# Patient Record
Sex: FEMALE | Race: AMERICAN INDIAN OR ALASKA NATIVE
[De-identification: names, ages, dates, MRNs, and addresses within clinical notes are randomized per-mention and may not be internally consistent; named-entity substitution may affect disease eponyms.]

---

## 2019-03-19 ENCOUNTER — HOSPITAL ENCOUNTER (EMERGENCY)
Dept: HOSPITAL 5 - ED | Age: 34
Discharge: HOME | End: 2019-03-19
Payer: COMMERCIAL

## 2019-03-19 VITALS — DIASTOLIC BLOOD PRESSURE: 90 MMHG | SYSTOLIC BLOOD PRESSURE: 135 MMHG

## 2019-03-19 DIAGNOSIS — K52.9: Primary | ICD-10-CM

## 2019-03-19 LAB
ALBUMIN SERPL-MCNC: 3.5 G/DL (ref 3.9–5)
ALT SERPL-CCNC: 11 UNITS/L (ref 7–56)
BASOPHILS # (AUTO): 0 K/MM3 (ref 0–0.1)
BASOPHILS NFR BLD AUTO: 0.6 % (ref 0–1.8)
BUN SERPL-MCNC: 10 MG/DL (ref 7–17)
BUN/CREAT SERPL: 17 %
CALCIUM SERPL-MCNC: 8.3 MG/DL (ref 8.4–10.2)
EOSINOPHIL # BLD AUTO: 0.1 K/MM3 (ref 0–0.4)
EOSINOPHIL NFR BLD AUTO: 1 % (ref 0–4.3)
HCT VFR BLD CALC: 37.2 % (ref 30.3–42.9)
HEMOLYSIS INDEX: 3
HGB BLD-MCNC: 12.2 GM/DL (ref 10.1–14.3)
LYMPHOCYTES # BLD AUTO: 1.9 K/MM3 (ref 1.2–5.4)
LYMPHOCYTES NFR BLD AUTO: 26.4 % (ref 13.4–35)
MCHC RBC AUTO-ENTMCNC: 33 % (ref 30–34)
MCV RBC AUTO: 85 FL (ref 79–97)
MONOCYTES # (AUTO): 0.5 K/MM3 (ref 0–0.8)
MONOCYTES % (AUTO): 6.3 % (ref 0–7.3)
PLATELET # BLD: 280 K/MM3 (ref 140–440)
RBC # BLD AUTO: 4.4 M/MM3 (ref 3.65–5.03)

## 2019-03-19 PROCEDURE — 96361 HYDRATE IV INFUSION ADD-ON: CPT

## 2019-03-19 PROCEDURE — 84703 CHORIONIC GONADOTROPIN ASSAY: CPT

## 2019-03-19 PROCEDURE — 85025 COMPLETE CBC W/AUTO DIFF WBC: CPT

## 2019-03-19 PROCEDURE — 99283 EMERGENCY DEPT VISIT LOW MDM: CPT

## 2019-03-19 PROCEDURE — 96374 THER/PROPH/DIAG INJ IV PUSH: CPT

## 2019-03-19 PROCEDURE — 36415 COLL VENOUS BLD VENIPUNCTURE: CPT

## 2019-03-19 PROCEDURE — 80053 COMPREHEN METABOLIC PANEL: CPT

## 2019-03-19 NOTE — EMERGENCY DEPARTMENT REPORT
ED General Adult HPI





- General


Chief complaint: Dizziness


Stated complaint: NAUSEA/DIZZY/DIARRHEA


Time Seen by Provider: 03/19/19 09:47


Source: patient


Mode of arrival: Ambulatory


Limitations: No Limitations





- History of Present Illness


Initial comments: 





Patient is a 34-year-old asthmatic female who does have a history of vertigo who

is complaining of some dizziness this morning.  Patient states that her symptoms

are worse when she closes her eyes and lays flat.  Patient feels better with 

sitting up or.  Patient denies any sinus congestion sore throat headache fevers 

or chills.  Patient has had some nausea and diarrhea for the past 3 days.  She 

denies any abdominal pain at this time.  Patient states she is not actively 

vomiting she just feels some mild nausea and the nausea is worse when she is 

laying flat and she started feeling dizzy.  Patient describes her dizziness as a

spinning sensation.


Severity scale (0 -10): 0





- Related Data


                                  Previous Rx's











 Medication  Instructions  Recorded  Last Taken  Type


 


Diphenoxylate HCl/Atropine 1 each PO BID PRN #10 tablet 03/19/19 Unknown Rx





[Lomotil 2.5-0.025 mg Tablet]    


 


Meclizine [Antivert] 25 mg PO TID PRN #10 tablet 03/19/19 Unknown Rx











                                    Allergies











Allergy/AdvReac Type Severity Reaction Status Date / Time


 


No Known Allergies Allergy   Verified 03/19/19 08:19














ED Review of Systems


ROS: 


Stated complaint: NAUSEA/DIZZY/DIARRHEA


Other details as noted in HPI





Comment: All other systems reviewed and negative





ED Past Medical Hx





- Social History


Smoking Status: Never Smoker


Substance Use Type: None





- Medications


Home Medications: 


                                Home Medications











 Medication  Instructions  Recorded  Confirmed  Last Taken  Type


 


Diphenoxylate HCl/Atropine 1 each PO BID PRN #10 tablet 03/19/19  Unknown Rx





[Lomotil 2.5-0.025 mg Tablet]     


 


Meclizine [Antivert] 25 mg PO TID PRN #10 tablet 03/19/19  Unknown Rx














ED Physical Exam





- General


Limitations: No Limitations


General appearance: alert, in no apparent distress





- Head


Head exam: Present: atraumatic, normocephalic





- Eye


Eye exam: Present: normal appearance





- ENT


ENT exam: Present: mucous membranes moist





- Neck


Neck exam: Present: normal inspection





- Respiratory


Respiratory exam: Present: normal lung sounds bilaterally.  Absent: respiratory 

distress, wheezes, rales, rhonchi





- Cardiovascular


Cardiovascular Exam: Present: regular rate, normal rhythm.  Absent: systolic 

murmur, diastolic murmur, rubs, gallop





- GI/Abdominal


GI/Abdominal exam: Present: soft, normal bowel sounds.  Absent: distended, 

tenderness, guarding, rebound, rigid





- Extremities Exam


Extremities exam: Present: normal inspection





- Back Exam


Back exam: Present: normal inspection





- Neurological Exam


Neurological exam: Present: alert, oriented X3





- Psychiatric


Psychiatric exam: Present: normal affect, normal mood





- Skin


Skin exam: Present: warm, dry, intact, normal color.  Absent: rash





ED Course


                                   Vital Signs











  03/19/19





  09:11


 


Temperature 98.0 F


 


Pulse Rate 78


 


Respiratory 18





Rate 


 


Blood Pressure 133/90


 


O2 Sat by Pulse 100





Oximetry 














ED Medical Decision Making





- Lab Data


Result diagrams: 


                                 03/19/19 09:31





                                 03/19/19 09:31





- Medical Decision Making





After being given the Antivert the patient's dizziness when lying flat has 

started to improve.  The patient was hydrated as well for some possible mild 

dehydration secondary to a diarrheal illness.  Patient feeling in improved and 

will be discharged home`


Critical care attestation.: 


If time is entered above; I have spent that time in minutes in the direct care 

of this critically ill patient, excluding procedure time.








ED Disposition


Clinical Impression: 


 Vertigo, Enteritis





Disposition: DC-01 TO HOME OR SELFCARE


Is pt being admited?: No


Does the pt Need Aspirin: No


Condition: Stable


Instructions:  Vertigo (ED), Gastroenteritis (ED)


Referrals: 


[facility], [place] [Primary Care Provider] - 3-5 Days


Time of Disposition: 11:00